# Patient Record
Sex: FEMALE | Race: WHITE | NOT HISPANIC OR LATINO | Employment: FULL TIME | URBAN - METROPOLITAN AREA
[De-identification: names, ages, dates, MRNs, and addresses within clinical notes are randomized per-mention and may not be internally consistent; named-entity substitution may affect disease eponyms.]

---

## 2023-10-14 ENCOUNTER — OFFICE VISIT (OUTPATIENT)
Dept: URGENT CARE | Age: 58
End: 2023-10-14
Payer: COMMERCIAL

## 2023-10-14 VITALS
RESPIRATION RATE: 16 BRPM | HEART RATE: 70 BPM | OXYGEN SATURATION: 97 % | SYSTOLIC BLOOD PRESSURE: 129 MMHG | DIASTOLIC BLOOD PRESSURE: 83 MMHG | TEMPERATURE: 97 F

## 2023-10-14 DIAGNOSIS — L03.011 PARONYCHIA OF RIGHT THUMB: Primary | ICD-10-CM

## 2023-10-14 PROCEDURE — 99283 EMERGENCY DEPT VISIT LOW MDM: CPT | Performed by: STUDENT IN AN ORGANIZED HEALTH CARE EDUCATION/TRAINING PROGRAM

## 2023-10-14 PROCEDURE — G0382 LEV 3 HOSP TYPE B ED VISIT: HCPCS | Performed by: STUDENT IN AN ORGANIZED HEALTH CARE EDUCATION/TRAINING PROGRAM

## 2023-10-14 PROCEDURE — 10060 I&D ABSCESS SIMPLE/SINGLE: CPT | Performed by: STUDENT IN AN ORGANIZED HEALTH CARE EDUCATION/TRAINING PROGRAM

## 2023-10-14 RX ORDER — AZITHROMYCIN 500 MG/1
TABLET, FILM COATED ORAL
COMMUNITY
Start: 2023-10-13

## 2023-10-14 RX ORDER — SULFAMETHOXAZOLE AND TRIMETHOPRIM 800; 160 MG/1; MG/1
1 TABLET ORAL EVERY 12 HOURS SCHEDULED
Qty: 10 TABLET | Refills: 0 | Status: SHIPPED | OUTPATIENT
Start: 2023-10-14 | End: 2023-10-19

## 2023-10-14 NOTE — PROGRESS NOTES
[facility]        NAME: Arely Abraham is a 63 y.o. female  : 1965    MRN: [id number]  DATE: 2023  TIME: 10:45 AM    Assessment and Plan   Paronychia of right thumb [L03.011]  1. Paronychia of right thumb  sulfamethoxazole-trimethoprim (BACTRIM DS) 800-160 mg per tablet      Incision with 11 blade along medial nail, expression of moderate purulant and scant bloody drainage. With immediate relief of pain/pressure. Switch abx to bactrim. Continue with clean warm epsom soaks to facilitate drainage. May continue topical abx ointment. To return if sx return or fail to continue to improve. Incision and drain    Date/Time: 10/14/2023 9:00 AM    Performed by: Evelia Randle DO  Authorized by: Evelia Randle DO  Universal Protocol:  Consent: Verbal consent obtained. Consent given by: patient  Patient understanding: patient states understanding of the procedure being performed  Patient identity confirmed: verbally with patient    Patient location:  Bedside  Location:     Type:  Abscess    Location:  Upper extremity    Upper extremity location:  R thumb  Pre-procedure details:     Skin preparation:  Betadine  Anesthesia (see MAR for exact dosages): Anesthesia method:  Topical application    Topical anesthesia: coolant spray. Procedure details:     Complexity:  Simple    Needle aspiration: no      Incision types:  Stab incision    Scalpel blade:  11    Incision depth:  Subcutaneous (2mm across)    Techniques: massage and expression of purulence. Drainage:  Purulent and bloody    Drainage amount: Moderate    Wound treatment:  Wound left open    Packing materials:  None  Post-procedure details:     Patient tolerance of procedure: Tolerated well, no immediate complications          Patient Instructions       Follow up with PCP in 3-5 days. Proceed to  ER if symptoms worsen.     Chief Complaint     Chief Complaint   Patient presents with    Hand Pain     Swelling at nail bed on right thumb with yellow abscess . Not draining. Started 2 days ago after SHAVON Apple Family member is a pediatrician prescribe azithromycin 500mg once daily. History of Present Illness       She reports that 5 days ago, she got a manicure. Since then, the medial side of her right thumb has been swelling. She has been trying warm Epson soaks but has not had any significant drainage. She is able to see head of abscess medial to her right thumbnail. No fevers, chills. No pain other than at her thumb where she describes the pain as pulsatile. She had been started on azithromycin by a friend. Just started this. Denies history of diabetes, immunosuppression. Review of Systems   Review of Systems   All other systems reviewed and are negative. Current Medications       Current Outpatient Medications:     azithromycin (ZITHROMAX) 500 MG tablet, , Disp: , Rfl:     sulfamethoxazole-trimethoprim (BACTRIM DS) 800-160 mg per tablet, Take 1 tablet by mouth every 12 (twelve) hours for 5 days, Disp: 10 tablet, Rfl: 0    Current Allergies     Allergies as of 10/14/2023    (No Known Allergies)            The following portions of the patient's history were reviewed and updated as appropriate: allergies, current medications, past family history, past medical history, past social history, past surgical history and problem list.     No past medical history on file. No past surgical history on file. No family history on file. Medications have been verified. Objective   /83   Pulse 70   Temp (!) 97 °F (36.1 °C) (Temporal)   Resp 16   SpO2 97%   No LMP recorded. Physical Exam     Physical Exam  Vitals and nursing note reviewed. Constitutional:       Appearance: Normal appearance. HENT:      Head: Normocephalic and atraumatic.       Right Ear: External ear normal.      Left Ear: External ear normal.      Nose: Nose normal.   Eyes:      Conjunctiva/sclera: Conjunctivae normal.   Musculoskeletal:         General: Swelling present. Comments: Full range of motion of her thumb. Skin:     Comments: Distal right thumb swelling, visible yellow purulence below skin (~4mm across) at medial nail margin with surrounding erythema. Erythema is isolated to distal thumb beyond DIP, no streaking   Neurological:      Mental Status: She is alert.